# Patient Record
Sex: FEMALE | Race: WHITE | NOT HISPANIC OR LATINO | Employment: UNEMPLOYED | URBAN - METROPOLITAN AREA
[De-identification: names, ages, dates, MRNs, and addresses within clinical notes are randomized per-mention and may not be internally consistent; named-entity substitution may affect disease eponyms.]

---

## 2017-04-11 ENCOUNTER — ANESTHESIA EVENT (OUTPATIENT)
Dept: SURGERY | Facility: OTHER | Age: 52
End: 2017-04-11
Payer: COMMERCIAL

## 2017-04-11 ENCOUNTER — HOSPITAL ENCOUNTER (OUTPATIENT)
Dept: PREADMISSION TESTING | Facility: OTHER | Age: 52
Discharge: HOME | End: 2017-04-11
Attending: ORTHOPAEDIC SURGERY
Payer: COMMERCIAL

## 2017-04-11 VITALS
SYSTOLIC BLOOD PRESSURE: 119 MMHG | BODY MASS INDEX: 20.49 KG/M2 | TEMPERATURE: 98 F | DIASTOLIC BLOOD PRESSURE: 67 MMHG | HEART RATE: 82 BPM | HEIGHT: 64 IN | OXYGEN SATURATION: 99 % | WEIGHT: 120 LBS

## 2017-04-11 RX ORDER — ESTRADIOL AND NORETHINDRONE ACETATE .5; .1 MG/1; MG/1
1 TABLET ORAL DAILY
COMMUNITY
End: 2019-12-05

## 2017-04-11 RX ORDER — TOPIRAMATE 50 MG/1
75 TABLET, FILM COATED ORAL 2 TIMES DAILY
COMMUNITY

## 2017-04-11 RX ORDER — BUDESONIDE AND FORMOTEROL FUMARATE DIHYDRATE 80; 4.5 UG/1; UG/1
2 AEROSOL RESPIRATORY (INHALATION) 2 TIMES DAILY
COMMUNITY

## 2017-04-11 RX ORDER — ALBUTEROL SULFATE 0.83 MG/ML
2.5 SOLUTION RESPIRATORY (INHALATION)
Status: CANCELLED | OUTPATIENT
Start: 2017-04-11 | End: 2017-04-11

## 2017-04-11 RX ORDER — LEVOTHYROXINE SODIUM 88 UG/1
88 TABLET ORAL DAILY
COMMUNITY
End: 2018-08-07

## 2017-04-11 RX ORDER — HYDROCHLOROTHIAZIDE 12.5 MG/1
12.5 CAPSULE ORAL DAILY
COMMUNITY

## 2017-04-11 RX ORDER — CYANOCOBALAMIN (VITAMIN B-12) 1000 MCG
TABLET, EXTENDED RELEASE ORAL DAILY
COMMUNITY

## 2017-04-11 RX ORDER — MIDAZOLAM HYDROCHLORIDE 5 MG/ML
4 INJECTION INTRAMUSCULAR; INTRAVENOUS ONCE AS NEEDED
Status: CANCELLED | OUTPATIENT
Start: 2017-04-11 | End: 2017-04-11

## 2017-04-11 RX ORDER — SODIUM CHLORIDE, SODIUM LACTATE, POTASSIUM CHLORIDE, CALCIUM CHLORIDE 600; 310; 30; 20 MG/100ML; MG/100ML; MG/100ML; MG/100ML
INJECTION, SOLUTION INTRAVENOUS CONTINUOUS
Status: CANCELLED | OUTPATIENT
Start: 2017-04-11

## 2017-04-11 RX ORDER — MELOXICAM 15 MG/1
15 TABLET ORAL DAILY
COMMUNITY
End: 2019-12-05

## 2017-04-11 RX ORDER — MONTELUKAST SODIUM 10 MG/1
10 TABLET ORAL DAILY
COMMUNITY

## 2017-04-11 RX ORDER — FLUTICASONE PROPIONATE 50 MCG
1 SPRAY, SUSPENSION (ML) NASAL DAILY PRN
COMMUNITY
End: 2019-12-05

## 2017-04-11 RX ORDER — FAMOTIDINE 20 MG/1
20 TABLET, FILM COATED ORAL
Status: CANCELLED | OUTPATIENT
Start: 2017-04-11 | End: 2017-04-11

## 2017-04-11 NOTE — IP AVS SNAPSHOT
[facility] Location ([facility])  [address] [place], [place] [id number]  Phone: [phone]           Patient Discharge Instructions  Our goal is to set you up for success. This packet includes information on your condition, medications, and your home care. It will help you care for yourself to prevent having to return to the hospital.     Please ask your nurse if you have any questions.      There are many details to remember when preparing for your surgery. Here is what you will need to do, please ask your nurse if there are more specific instructions and if you have any questions:    1. Before procedure Do not smoke or drink alcoholic beverages 24 hours prior to your procedure. Do not eat or drink anything 8 hours before your procedure - this includes gum, mints, and candy.     2. Day of procedure Please remove all jewelry for the procedure. If you wear contact lenses, dentures, hearing aids or glasses, bring a container to put them in during your surgery and give to a family member.  If your doctor has scheduled you for an overnight stay, bring a small overnight bag with any personal items that you need.      3. After procedure  Make arrangements in advance for transportation home by a responsible adult. It is not safe to drive a vehicle during the 24 hours following surgery.     PLEASE NOTE: You may be contacted the day before your surgery to confirm your surgery date and arrival time. The Surgery schedule has many variables which may affect the time of your surgery case. Family members should be available if your surgery time changes.               ** Verify the list of medication(s) below is accurate and up to date. Carry this with you in case of emergency. If your medications have changed, please notify your healthcare provider.             Medication List      TAKE these medications        Additional Info                      ALLEGRA ORAL   Refills:  0    Instructions:  Take by mouth once daily.      Begin Date    AM    Noon    PM    Bedtime       COD LIVER OIL ORAL   Refills:  0    Instructions:  Take by mouth once daily.     Begin Date    AM    Noon    PM    Bedtime       FIBER ORAL   Refills:  0    Instructions:  Take by mouth once daily.     Begin Date    AM    Noon    PM    Bedtime       fluticasone 50 mcg/actuation nasal spray   Commonly known as:  FLONASE   Refills:  0   Dose:  1 spray    Instructions:  1 spray by Each Nare route daily as needed for Rhinitis.     Begin Date    AM    Noon    PM    Bedtime       hydrochlorothiazide 12.5 mg capsule   Commonly known as:  MICROZIDE   Refills:  0   Dose:  12.5 mg    Instructions:  Take 12.5 mg by mouth once daily.     Begin Date    AM    Noon    PM    Bedtime       levothyroxine 88 MCG tablet   Commonly known as:  SYNTHROID   Refills:  0   Dose:  88 mcg    Instructions:  Take 88 mcg by mouth once daily.     Begin Date    AM    Noon    PM    Bedtime       LOPREEZA 0.5-0.1 mg per tablet   Refills:  0   Dose:  1 tablet   Generic drug:  estradiol-norethindrone acet    Instructions:  Take 1 tablet by mouth once daily.     Begin Date    AM    Noon    PM    Bedtime       meloxicam 15 MG tablet   Commonly known as:  MOBIC   Refills:  0   Dose:  15 mg    Instructions:  Take 15 mg by mouth once daily.     Begin Date    AM    Noon    PM    Bedtime       montelukast 10 mg tablet   Commonly known as:  SINGULAIR   Refills:  0   Dose:  10 mg    Instructions:  Take 10 mg by mouth once daily.     Begin Date    AM    Noon    PM    Bedtime       PROAIR HFA INHL   Refills:  0    Instructions:  Inhale into the lungs as needed.     Begin Date    AM    Noon    PM    Bedtime       PROBIOTIC ORAL   Refills:  0    Instructions:  Take by mouth.     Begin Date    AM    Noon    PM    Bedtime       selenium 200 mcg Cap   Refills:  0    Instructions:  Take by mouth once daily.     Begin Date    AM    Noon    PM    Bedtime       SYMBICORT 80-4.5 mcg/actuation Hfaa   Refills:  0   Dose:  2  puff   Generic drug:  budesonide-formoterol 80-4.5 mcg    Instructions:  Inhale 2 puffs into the lungs 2 (two) times daily. Controller     Begin Date    AM    Noon    PM    Bedtime       TOPAMAX ORAL   Refills:  0   Dose:  75 mg    Instructions:  Take 75 mg by mouth 2 (two) times daily.     Begin Date    AM    Noon    PM    Bedtime       VITAMIN D-3 ORAL   Refills:  0    Instructions:  Take by mouth once daily.     Begin Date    AM    Noon    PM    Bedtime                  Please bring to all follow up appointments:    1. A copy of your discharge instructions.  2. All medicines you are currently taking in their original bottles.  3. Identification and insurance card.    Please arrive 15 minutes ahead of scheduled appointment time.    Please call 24 hours in advance if you must reschedule your appointment and/or time.        Your Future Surgeries/Procedures     Apr 18, 2017   Surgery with Eran Barnes MD   [facility] ([facility])    [address] [place] [id number]   [phone]                  Discharge Instructions       PRE-ADMIT TESTING -  [phone]    [address] [place] [address]        OUTPATIENT SURGERY UNIT - [phone]    Your surgery has been scheduled at [facility]. We are pleased to have the opportunity to serve you. For Further Information please call [phone].    On the day of surgery please report to the Information Desk on the 1st floor.    CONTACT YOUR PHYSICIAN'S OFFICE THE DAY PRIOR TO YOUR SURGERY TO OBTAIN YOUR ARRIVAL TIME.     The evening before surgery do not eat anything after 9 p.m. ( this includes hard candy, chewing gum and mints).  You may have GATORADE, POWERADE AND WATER FROM 9 p.m. until leaving home to come to the hospital.   DO NOT DRINK ANY LIQUIDS ON THE WAY TO THE HOSPITAL.     SPECIAL MEDICATION INSTRUCTIONS: TAKE medications checked off by the Anesthesiologist on your Medication  List.    Angiogram Patients: Take medications as instructed by your physician, including aspirin.     Surgery Patients:    If you take ASPIRIN - Your PHYSICIAN/SURGEON will need to inform you IF/OR when you need to stop taking aspirin prior to your surgery.     Do Not take any medications containing IBUPROFEN.  Do Not Wear any make-up or dark nail polish   (especially eye make-up) to surgery. If you come to surgery with makeup on you will be required to remove the makeup or nail polish.    Do not shave your surgical area at least 5 days prior to your surgery. The surgical prep will be performed at the hospital according to Infection Control regulations.    Leave all valuables at home.   Do Not wear any jewelry or watches, including any metal in body piercings.  Contact Lens must be removed before surgery. Either do not wear the contact lens or bring a case and solution for storage.  Please bring a container for eyeglasses or dentures as required.  Bring any paperwork your physician has provided, such as consent forms,  history and physicals, doctor's orders, etc.   Bring comfortable clothes that are loose fitting to wear upon discharge. Take into consideration the type of surgery being performed.  Maintain your diet as advised per your physician the day prior to surgery.      Adequate rest the night before surgery is advised.   Park in the Parking lot behind the hospital or in the [place] [facility] across the street from the parking lot. Parking is complimentary.  If you will be discharged the same day as your procedure, please arrange for a responsible adult to drive you home or to accompany you if traveling by taxi.   YOU WILL NOT BE PERMITTED TO DRIVE OR TO LEAVE THE HOSPITAL ALONE AFTER SURGERY.   It is strongly recommended that you arrange for someone to remain with you for the first 24 hrs following your surgery.       Thank you for your cooperation.  The Staff of [facility].        Bathing Instructions                                                                 Please shower the evening before and morning of your procedure with    ANTIBACTERIAL SOAP. ( DIAL, etc )  Concentrate on the surgical area   for at least 3 minutes and rinse completely. Dry off as usual.   Do not use any deodorant, powder, body lotions, perfume, after shave or    cologne.                                                Admission Information     Date & Time Provider Department CSN    4/11/2017  2:30 PM Eran Barnes MD [facility] [id number]      Care Providers     Provider Role Specialty Primary office phone    Eran Barnes MD Attending Provider Orthopedic Surgery [phone]      Your Vitals Were     BP Pulse Temp Height Weight Last Period    119/67 82 98.1 °F (36.7 °C) (Oral) 5' 4" (1.626 m) 54.4 kg (120 lb) 06/11/2015    SpO2 BMI             99% 20.6 kg/m2         Recent Lab Values     No lab values to display.      Allergies as of 4/11/2017     No Known Allergies      [facility]     [facility] Nurse Care Line - 24/7 Assistance  Unless otherwise directed by your provider, please contact [facility], our nurse care line that is available for 24/7 assistance.     Registered nurses in the [facility] provide clinical advisement, health education, appointment booking, and other advisory services.  Call for this free service at [phone].        Advance Directives     An advance directive is a document which, in the event you are no longer able to make decisions for yourself, tells your healthcare team what kind of treatment you do or do not want to receive, or who you would like to make those decisions for you.  If you do not currently have an advance directive, [facility] encourages you to create one.  For more information call:  [phone]), [phone] ([phone]),  or log on to [email or web address].        Language " Assistance Services     ATTENTION: Language assistance services are available, free of charge. Please call [phone].      ATENCIÓN: Si habla renato, tiene a valdivia disposición servicios gratuitos de asistencia lingüística. Llame al [phone].     CHÚ Ý: N?u b?n nói Ti?ng Vi?t, có các d?ch v? h? tr? ngôn ng? mi?n phí dành cho b?n. G?i s? [phone].         [facility] complies with applicable Federal civil rights laws and does not discriminate on the basis of race, color, national origin, age, disability, or sex.

## 2017-04-11 NOTE — ANESTHESIA PREPROCEDURE EVALUATION
04/11/2017  Amna Mcdonnell is a 51 y.o., female.    Anesthesia Evaluation    I have reviewed the Patient Summary Reports.    I have reviewed the Nursing Notes.   I have reviewed the Medications.     Review of Systems  Anesthesia Hx:  No problems with previous Anesthesia    Social:  Non-Smoker    Cardiovascular:   Exercise tolerance: good Hypertension, well controlled    Pulmonary:   Asthma mild Mycobacteerium avium complex (non-contageous TB)-in remission. Caused her mild asthma   Renal/:  Renal/ Normal     Hepatic/GI:  Hepatic/GI Normal    Neurological:  Neurology Normal    Endocrine:   Hypothyroidism        Physical Exam  General:  Well nourished    Airway/Jaw/Neck:  Mallampati: I     Dental:  Dental Findings: In tact             Anesthesia Plan  Type of Anesthesia, risks & benefits discussed:  Anesthesia Type:  general  Patient's Preference:   Intra-op Monitoring Plan: standard ASA monitors  Intra-op Monitoring Plan Comments:   Post Op Pain Control Plan:   Post Op Pain Control Plan Comments:   Induction:   IV  Beta Blocker:         Informed Consent: Patient understands risks and agrees with Anesthesia plan.  Questions answered.   ASA Score: 2     Day of Surgery Review of History & Physical:    H&P update referred to the surgeon.     Anesthesia Plan Notes: Surg on rt arm,IV ok in left arm acc to Dr Barnes even though manipulating left arm.No foot iv please        Ready For Surgery From Anesthesia Perspective.

## 2017-04-11 NOTE — DISCHARGE INSTRUCTIONS
PRE-ADMIT TESTING -  [phone]    [address]  [facility]        OUTPATIENT SURGERY UNIT - [phone]    Your surgery has been scheduled at [facility]. We are pleased to have the opportunity to serve you. For Further Information please call [phone].    On the day of surgery please report to the Information Desk on the 1st floor.    CONTACT YOUR PHYSICIAN'S OFFICE THE DAY PRIOR TO YOUR SURGERY TO OBTAIN YOUR ARRIVAL TIME.     The evening before surgery do not eat anything after 9 p.m. ( this includes hard candy, chewing gum and mints).  You may have GATORADE, POWERADE AND WATER FROM 9 p.m. until leaving home to come to the hospital.   DO NOT DRINK ANY LIQUIDS ON THE WAY TO THE HOSPITAL.     SPECIAL MEDICATION INSTRUCTIONS: TAKE medications checked off by the Anesthesiologist on your Medication List.    Angiogram Patients: Take medications as instructed by your physician, including aspirin.     Surgery Patients:    If you take ASPIRIN - Your PHYSICIAN/SURGEON will need to inform you IF/OR when you need to stop taking aspirin prior to your surgery.     Do Not take any medications containing IBUPROFEN.  Do Not Wear any make-up or dark nail polish   (especially eye make-up) to surgery. If you come to surgery with makeup on you will be required to remove the makeup or nail polish.    Do not shave your surgical area at least 5 days prior to your surgery. The surgical prep will be performed at the hospital according to Infection Control regulations.    Leave all valuables at home.   Do Not wear any jewelry or watches, including any metal in body piercings.  Contact Lens must be removed before surgery. Either do not wear the contact lens or bring a case and solution for storage.  Please bring a container for eyeglasses or dentures as required.  Bring any paperwork your physician has provided, such as consent forms,  history and physicals, doctor's orders, etc.   Bring comfortable  clothes that are loose fitting to wear upon discharge. Take into consideration the type of surgery being performed.  Maintain your diet as advised per your physician the day prior to surgery.      Adequate rest the night before surgery is advised.   Park in the Parking lot behind the hospital or in the [place] [facility] across the street from the parking lot. Parking is complimentary.  If you will be discharged the same day as your procedure, please arrange for a responsible adult to drive you home or to accompany you if traveling by taxi.   YOU WILL NOT BE PERMITTED TO DRIVE OR TO LEAVE THE HOSPITAL ALONE AFTER SURGERY.   It is strongly recommended that you arrange for someone to remain with you for the first 24 hrs following your surgery.       Thank you for your cooperation.  The Staff of [facility].        Bathing Instructions                                                                 Please shower the evening before and morning of your procedure with    ANTIBACTERIAL SOAP. ( DIAL, etc )  Concentrate on the surgical area   for at least 3 minutes and rinse completely. Dry off as usual.   Do not use any deodorant, powder, body lotions, perfume, after shave or    cologne.

## 2017-04-18 ENCOUNTER — HOSPITAL ENCOUNTER (OUTPATIENT)
Facility: OTHER | Age: 52
Discharge: HOME | End: 2017-04-18
Attending: ORTHOPAEDIC SURGERY | Admitting: ORTHOPAEDIC SURGERY
Payer: COMMERCIAL

## 2017-04-18 ENCOUNTER — ANESTHESIA (OUTPATIENT)
Dept: SURGERY | Facility: OTHER | Age: 52
End: 2017-04-18
Payer: COMMERCIAL

## 2017-04-18 VITALS
DIASTOLIC BLOOD PRESSURE: 68 MMHG | SYSTOLIC BLOOD PRESSURE: 110 MMHG | RESPIRATION RATE: 16 BRPM | TEMPERATURE: 98 F | HEART RATE: 65 BPM | HEIGHT: 64 IN | WEIGHT: 120 LBS | OXYGEN SATURATION: 100 % | BODY MASS INDEX: 20.49 KG/M2

## 2017-04-18 DIAGNOSIS — M75.01 ADHESIVE BURSITIS OF RIGHT SHOULDER: Primary | ICD-10-CM

## 2017-04-18 PROCEDURE — 25000242 PHARM REV CODE 250 ALT 637 W/ HCPCS: Performed by: ANESTHESIOLOGY

## 2017-04-18 PROCEDURE — 63600175 PHARM REV CODE 636 W HCPCS: Performed by: NURSE ANESTHETIST, CERTIFIED REGISTERED

## 2017-04-18 PROCEDURE — 37000008 HC ANESTHESIA 1ST 15 MINUTES: Performed by: ORTHOPAEDIC SURGERY

## 2017-04-18 PROCEDURE — 63600175 PHARM REV CODE 636 W HCPCS: Performed by: ORTHOPAEDIC SURGERY

## 2017-04-18 PROCEDURE — 63600175 PHARM REV CODE 636 W HCPCS: Performed by: ANESTHESIOLOGY

## 2017-04-18 PROCEDURE — 25000003 PHARM REV CODE 250: Performed by: NURSE ANESTHETIST, CERTIFIED REGISTERED

## 2017-04-18 PROCEDURE — 71000033 HC RECOVERY, INTIAL HOUR: Performed by: ORTHOPAEDIC SURGERY

## 2017-04-18 PROCEDURE — 94640 AIRWAY INHALATION TREATMENT: CPT

## 2017-04-18 PROCEDURE — 36000710: Performed by: ORTHOPAEDIC SURGERY

## 2017-04-18 PROCEDURE — 71000015 HC POSTOP RECOV 1ST HR: Performed by: ORTHOPAEDIC SURGERY

## 2017-04-18 PROCEDURE — 36000711: Performed by: ORTHOPAEDIC SURGERY

## 2017-04-18 PROCEDURE — 25000003 PHARM REV CODE 250: Performed by: ANESTHESIOLOGY

## 2017-04-18 PROCEDURE — 27201423 OPTIME MED/SURG SUP & DEVICES STERILE SUPPLY: Performed by: ORTHOPAEDIC SURGERY

## 2017-04-18 PROCEDURE — 37000009 HC ANESTHESIA EA ADD 15 MINS: Performed by: ORTHOPAEDIC SURGERY

## 2017-04-18 PROCEDURE — 71000039 HC RECOVERY, EACH ADD'L HOUR: Performed by: ORTHOPAEDIC SURGERY

## 2017-04-18 PROCEDURE — 71000016 HC POSTOP RECOV ADDL HR: Performed by: ORTHOPAEDIC SURGERY

## 2017-04-18 RX ORDER — DEXAMETHASONE SODIUM PHOSPHATE 4 MG/ML
INJECTION, SOLUTION INTRA-ARTICULAR; INTRALESIONAL; INTRAMUSCULAR; INTRAVENOUS; SOFT TISSUE
Status: DISCONTINUED | OUTPATIENT
Start: 2017-04-18 | End: 2017-04-18

## 2017-04-18 RX ORDER — MEPERIDINE HYDROCHLORIDE 50 MG/ML
12.5 INJECTION INTRAMUSCULAR; INTRAVENOUS; SUBCUTANEOUS ONCE AS NEEDED
Status: COMPLETED | OUTPATIENT
Start: 2017-04-18 | End: 2017-04-18

## 2017-04-18 RX ORDER — EPINEPHRINE 1 MG/ML
INJECTION, SOLUTION INTRACARDIAC; INTRAMUSCULAR; INTRAVENOUS; SUBCUTANEOUS
Status: DISCONTINUED | OUTPATIENT
Start: 2017-04-18 | End: 2017-04-18 | Stop reason: HOSPADM

## 2017-04-18 RX ORDER — HYDROCODONE BITARTRATE AND ACETAMINOPHEN 5; 325 MG/1; MG/1
1 TABLET ORAL EVERY 4 HOURS PRN
Status: DISCONTINUED | OUTPATIENT
Start: 2017-04-18 | End: 2017-04-18 | Stop reason: HOSPADM

## 2017-04-18 RX ORDER — HYDROMORPHONE HYDROCHLORIDE 2 MG/ML
0.4 INJECTION, SOLUTION INTRAMUSCULAR; INTRAVENOUS; SUBCUTANEOUS EVERY 5 MIN PRN
Status: DISCONTINUED | OUTPATIENT
Start: 2017-04-18 | End: 2017-04-18 | Stop reason: HOSPADM

## 2017-04-18 RX ORDER — MIDAZOLAM HYDROCHLORIDE 5 MG/ML
4 INJECTION INTRAMUSCULAR; INTRAVENOUS ONCE AS NEEDED
Status: COMPLETED | OUTPATIENT
Start: 2017-04-18 | End: 2017-04-18

## 2017-04-18 RX ORDER — CEFAZOLIN SODIUM 1 G/50ML
1 SOLUTION INTRAVENOUS
Status: COMPLETED | OUTPATIENT
Start: 2017-04-18 | End: 2017-04-18

## 2017-04-18 RX ORDER — ONDANSETRON 2 MG/ML
4 INJECTION INTRAMUSCULAR; INTRAVENOUS ONCE AS NEEDED
Status: DISCONTINUED | OUTPATIENT
Start: 2017-04-18 | End: 2017-04-18 | Stop reason: HOSPADM

## 2017-04-18 RX ORDER — ALBUTEROL SULFATE 0.83 MG/ML
2.5 SOLUTION RESPIRATORY (INHALATION)
Status: COMPLETED | OUTPATIENT
Start: 2017-04-18 | End: 2017-04-18

## 2017-04-18 RX ORDER — PROPOFOL 10 MG/ML
VIAL (ML) INTRAVENOUS
Status: DISCONTINUED | OUTPATIENT
Start: 2017-04-18 | End: 2017-04-18

## 2017-04-18 RX ORDER — FENTANYL CITRATE 50 UG/ML
25 INJECTION, SOLUTION INTRAMUSCULAR; INTRAVENOUS EVERY 5 MIN PRN
Status: DISCONTINUED | OUTPATIENT
Start: 2017-04-18 | End: 2017-04-18 | Stop reason: HOSPADM

## 2017-04-18 RX ORDER — FAMOTIDINE 20 MG/1
20 TABLET, FILM COATED ORAL
Status: COMPLETED | OUTPATIENT
Start: 2017-04-18 | End: 2017-04-18

## 2017-04-18 RX ORDER — GLYCOPYRROLATE 0.2 MG/ML
INJECTION INTRAMUSCULAR; INTRAVENOUS
Status: DISCONTINUED | OUTPATIENT
Start: 2017-04-18 | End: 2017-04-18

## 2017-04-18 RX ORDER — SODIUM CHLORIDE 0.9 % (FLUSH) 0.9 %
3 SYRINGE (ML) INJECTION
Status: DISCONTINUED | OUTPATIENT
Start: 2017-04-18 | End: 2017-04-18 | Stop reason: HOSPADM

## 2017-04-18 RX ORDER — SODIUM CHLORIDE, SODIUM LACTATE, POTASSIUM CHLORIDE, CALCIUM CHLORIDE 600; 310; 30; 20 MG/100ML; MG/100ML; MG/100ML; MG/100ML
INJECTION, SOLUTION INTRAVENOUS CONTINUOUS
Status: DISCONTINUED | OUTPATIENT
Start: 2017-04-18 | End: 2017-04-18 | Stop reason: HOSPADM

## 2017-04-18 RX ORDER — FENTANYL CITRATE 50 UG/ML
INJECTION, SOLUTION INTRAMUSCULAR; INTRAVENOUS
Status: DISCONTINUED | OUTPATIENT
Start: 2017-04-18 | End: 2017-04-18

## 2017-04-18 RX ORDER — MIDAZOLAM HYDROCHLORIDE 1 MG/ML
INJECTION INTRAMUSCULAR; INTRAVENOUS
Status: DISCONTINUED | OUTPATIENT
Start: 2017-04-18 | End: 2017-04-18

## 2017-04-18 RX ORDER — NEOSTIGMINE METHYLSULFATE 1 MG/ML
INJECTION, SOLUTION INTRAVENOUS
Status: DISCONTINUED | OUTPATIENT
Start: 2017-04-18 | End: 2017-04-18

## 2017-04-18 RX ORDER — PHENYLEPHRINE HYDROCHLORIDE 10 MG/ML
INJECTION INTRAVENOUS
Status: DISCONTINUED | OUTPATIENT
Start: 2017-04-18 | End: 2017-04-18

## 2017-04-18 RX ORDER — SODIUM CHLORIDE 0.9 % (FLUSH) 0.9 %
3 SYRINGE (ML) INJECTION EVERY 8 HOURS
Status: DISCONTINUED | OUTPATIENT
Start: 2017-04-18 | End: 2017-04-18 | Stop reason: HOSPADM

## 2017-04-18 RX ORDER — LIDOCAINE HCL/PF 100 MG/5ML
SYRINGE (ML) INTRAVENOUS
Status: DISCONTINUED | OUTPATIENT
Start: 2017-04-18 | End: 2017-04-18

## 2017-04-18 RX ORDER — HYDROCODONE BITARTRATE AND ACETAMINOPHEN 10; 325 MG/1; MG/1
1 TABLET ORAL EVERY 4 HOURS PRN
Qty: 40 TABLET | Refills: 0 | Status: SHIPPED | OUTPATIENT
Start: 2017-04-18 | End: 2019-12-05

## 2017-04-18 RX ORDER — OXYCODONE HYDROCHLORIDE 5 MG/1
5 TABLET ORAL
Status: DISCONTINUED | OUTPATIENT
Start: 2017-04-18 | End: 2017-04-18 | Stop reason: HOSPADM

## 2017-04-18 RX ORDER — HYDROCODONE BITARTRATE AND ACETAMINOPHEN 10; 325 MG/1; MG/1
1 TABLET ORAL EVERY 4 HOURS PRN
Status: DISCONTINUED | OUTPATIENT
Start: 2017-04-18 | End: 2017-04-18 | Stop reason: HOSPADM

## 2017-04-18 RX ORDER — ROCURONIUM BROMIDE 10 MG/ML
INJECTION, SOLUTION INTRAVENOUS
Status: DISCONTINUED | OUTPATIENT
Start: 2017-04-18 | End: 2017-04-18

## 2017-04-18 RX ORDER — ROPIVACAINE HYDROCHLORIDE 5 MG/ML
INJECTION, SOLUTION EPIDURAL; INFILTRATION; PERINEURAL
Status: DISCONTINUED | OUTPATIENT
Start: 2017-04-18 | End: 2017-04-18 | Stop reason: HOSPADM

## 2017-04-18 RX ORDER — ACETAMINOPHEN 10 MG/ML
INJECTION, SOLUTION INTRAVENOUS
Status: DISCONTINUED | OUTPATIENT
Start: 2017-04-18 | End: 2017-04-18

## 2017-04-18 RX ORDER — ONDANSETRON 2 MG/ML
INJECTION INTRAMUSCULAR; INTRAVENOUS
Status: DISCONTINUED | OUTPATIENT
Start: 2017-04-18 | End: 2017-04-18

## 2017-04-18 RX ADMIN — ACETAMINOPHEN 1000 MG: 10 INJECTION, SOLUTION INTRAVENOUS at 07:04

## 2017-04-18 RX ADMIN — HYDROMORPHONE HYDROCHLORIDE 0.4 MG: 2 INJECTION INTRAMUSCULAR; INTRAVENOUS; SUBCUTANEOUS at 08:04

## 2017-04-18 RX ADMIN — CEFAZOLIN SODIUM 1 G: 1 SOLUTION INTRAVENOUS at 07:04

## 2017-04-18 RX ADMIN — GLYCOPYRROLATE 0.6 MG: 0.2 INJECTION, SOLUTION INTRAMUSCULAR; INTRAVENOUS at 07:04

## 2017-04-18 RX ADMIN — PROMETHAZINE HYDROCHLORIDE 6.25 MG: 25 INJECTION INTRAMUSCULAR; INTRAVENOUS at 08:04

## 2017-04-18 RX ADMIN — HYDROMORPHONE HYDROCHLORIDE 0.4 MG: 2 INJECTION INTRAMUSCULAR; INTRAVENOUS; SUBCUTANEOUS at 09:04

## 2017-04-18 RX ADMIN — OXYCODONE HYDROCHLORIDE 5 MG: 5 TABLET ORAL at 10:04

## 2017-04-18 RX ADMIN — FAMOTIDINE 20 MG: 20 TABLET, FILM COATED ORAL at 06:04

## 2017-04-18 RX ADMIN — ONDANSETRON 4 MG: 2 INJECTION INTRAMUSCULAR; INTRAVENOUS at 07:04

## 2017-04-18 RX ADMIN — FENTANYL CITRATE 100 MCG: 50 INJECTION, SOLUTION INTRAMUSCULAR; INTRAVENOUS at 06:04

## 2017-04-18 RX ADMIN — LIDOCAINE HYDROCHLORIDE 75 MG: 20 INJECTION, SOLUTION INTRAVENOUS at 06:04

## 2017-04-18 RX ADMIN — MEPERIDINE HYDROCHLORIDE 12.5 MG: 50 INJECTION INTRAMUSCULAR; INTRAVENOUS; SUBCUTANEOUS at 08:04

## 2017-04-18 RX ADMIN — SODIUM CHLORIDE, SODIUM LACTATE, POTASSIUM CHLORIDE, AND CALCIUM CHLORIDE: 600; 310; 30; 20 INJECTION, SOLUTION INTRAVENOUS at 06:04

## 2017-04-18 RX ADMIN — PROPOFOL 150 MG: 10 INJECTION, EMULSION INTRAVENOUS at 06:04

## 2017-04-18 RX ADMIN — ALBUTEROL SULFATE 2.5 MG: 2.5 SOLUTION RESPIRATORY (INHALATION) at 06:04

## 2017-04-18 RX ADMIN — ROCURONIUM BROMIDE 35 MG: 10 INJECTION, SOLUTION INTRAVENOUS at 06:04

## 2017-04-18 RX ADMIN — PHENYLEPHRINE HYDROCHLORIDE 100 MCG: 10 INJECTION INTRAVENOUS at 07:04

## 2017-04-18 RX ADMIN — CARBOXYMETHYLCELLULOSE SODIUM 2 DROP: 2.5 SOLUTION/ DROPS OPHTHALMIC at 06:04

## 2017-04-18 RX ADMIN — MIDAZOLAM HYDROCHLORIDE 2 MG: 1 INJECTION, SOLUTION INTRAMUSCULAR; INTRAVENOUS at 06:04

## 2017-04-18 RX ADMIN — MIDAZOLAM HYDROCHLORIDE 4 MG: 5 INJECTION, SOLUTION INTRAMUSCULAR; INTRAVENOUS at 06:04

## 2017-04-18 RX ADMIN — DEXAMETHASONE SODIUM PHOSPHATE 4 MG: 4 INJECTION, SOLUTION INTRAMUSCULAR; INTRAVENOUS at 07:04

## 2017-04-18 RX ADMIN — NEOSTIGMINE METHYLSULFATE 4 MG: 1 INJECTION INTRAVENOUS at 07:04

## 2017-04-18 NOTE — PLAN OF CARE
Problem: Patient Care Overview  Goal: Plan of Care Review  Outcome: Ongoing (interventions implemented as appropriate)  Patient on room air with adequate saturation in no distress; one time aerosol treatment given tolerated well.

## 2017-04-18 NOTE — PLAN OF CARE
Amna Mcdonnell has met all discharge criteria from Phase II. Vital Signs are stable, ambulating  without difficulty.Pain is now under control and tolerable for the pt. Pain score 5 at this time.  Discharge instructions given, patient verbalized understanding. Discharged from facility via wheelchair in stable condition.

## 2017-04-18 NOTE — OP NOTE
DATE OF PROCEDURE:  04/18/2017    CHIEF COMPLAINT AND PRESENT ILLNESS:  The patient is a 51-year-old with   stiffness, pain, both shoulders, right worse than left.  She has had injections,   therapy for many months, no improvement.  Diagnosis, adhesive capsulitis, both   sides, right worse than left.  Because of long duration, no improvement, the   patient elected for left shoulder manipulation, right shoulder manipulation and   lysis of adhesions arthroscopy and right side is worse.  Significant risks   discussed.    PREOPERATIVE DIAGNOSIS:  Bilateral adhesive capsulitis of the shoulder.    POSTOPERATIVE DIAGNOSIS:  Bilateral adhesive capsulitis of the shoulder.    PROCEDURES:  1.  Right shoulder manipulation under anesthesia, arthroscopic lysis of   adhesions with subacromial decompression.  2.  Left shoulder manipulation under anesthesia.    SURGEON:  Eran Barnes M.D.    ASSISTANT:  Urmila Shaw CST.    COMPLICATIONS:  None.    ANESTHESIA:  General anesthesia.    BLOOD LOSS:  None.    IMPLANTS:  None.    PROCEDURE IN DETAIL:  The patient was brought to the Operating Room and   underwent general intubation without difficulty.  The right shoulder was   manipulated under anesthesia.  She got to about 110 degrees forward elevation   and then just stopped.  So, just gentle pressure and the usual crepitance lysis   occurred and then checked with the rotation and she had full range of motion   after that, no instability.  She was placed carefully in beach chair position   and the right shoulder was prepped and draped in the usual sterile fashion.    Using posterior viewing portal, anterior working portal, examination as follows:    The glenohumeral joint actually looked pretty good.  No chondromalacia.    Labrum looked good and it showed some synovitis which is typical, which was   cleaned up with a shaver and the electrothermal device.  The biceps was in good   shape.  The undersurface of the rotator cuff  looked good.  Attention then turned   to subacromial space with posterior viewing portal, lateral working portal.    She did have a fair amount of bursitis.  She did have downsloping of the   acromion, which was removed with a bur shaver and the electrothermal device.    The bursal surface of the rotator cuff showed the irritation, but no tearing or   anything.  The shoulder was brought through a range of motion.  Again made sure   that I had good clear space.  Instruments then removed.  A 3-0 Monocryl was used   subcuticularly, Steri-Strips on the skin.  Then, 0.5% ropivacaine was instilled   in the soft tissues and a sterile bandage was applied.  Then, I went over to   the left side and did a manipulation.  The left side was nearest tight.  She got   about 140 or so forward elevation before I met resistance and then did the   gentle pressure and got the lysis of adhesions and crepitance.  I got full range   of motion out of it and then, no instability afterwards and again, full motion.    I put about 10 mL of ropivacaine on the left side.  She was brought under   clean conditions.  The patient was brought to the Recovery Room in stable   fashion.      NAVJOT  dd: 04/18/2017 07:49:22 (CDT)  td: 04/18/2017 10:05:12 (CDT)  Doc ID   #[id number]  Job ID #[id number]    CC:

## 2017-04-18 NOTE — TRANSFER OF CARE
"Anesthesia Transfer of Care Note    Patient: Amna Mcdonnell    Procedure(s) Performed: Procedure(s) (LRB):  MANIPULATION-SHOULDER (Bilateral)  LYSIS-ADHESION (Right)  ARTHROSCOPY-SHOULDER (Right)  DECOMPRESSION-SHOULDER-ARTHROSCOPIC (Right)    Patient location: PACU    Anesthesia Type: general    Transport from OR: Transported from OR on 2-3 L/min O2 by NC with adequate spontaneous ventilation    Post pain: adequate analgesia    Post assessment: no apparent anesthetic complications    Post vital signs: stable    Level of consciousness: awake, alert and oriented    Nausea/Vomiting: no nausea/vomiting    Complications: none          Last vitals:   Visit Vitals    /81 (BP Location: Right arm, Patient Position: Lying)    Pulse 76    Temp 36.8 °C (98.2 °F) (Oral)    Resp 16    Ht 5' 4" (1.626 m)    Wt 54.4 kg (120 lb)    LMP 06/11/2015    SpO2 98%    Breastfeeding No    BMI 20.6 kg/m2     "

## 2017-04-18 NOTE — INTERVAL H&P NOTE
The patient has been examined and the H&P has been reviewed:    I concur with the findings and no changes have occurred since H&P was written.    Anesthesia/Surgery risks, benefits and alternative options discussed and understood by patient/family.          Active Hospital Problems    Diagnosis  POA    *Adhesive bursitis of right shoulder [M75.01]  Yes      Resolved Hospital Problems    Diagnosis Date Resolved POA   No resolved problems to display.

## 2017-04-18 NOTE — OR NURSING
Pt resting with eyes closed and VSS, when woken for pain assessment, states Right shoulder pain is 8/10 and immediately falls asleep snoring.

## 2017-04-18 NOTE — BRIEF OP NOTE
[facility]  Brief Operative Note     SUMMARY     Surgery Date: 4/18/2017     Surgeon(s) and Role:     * Eran Barnes MD - Primary    Assisting Surgeon: None    Pre-op Diagnosis:  Adhesive bursitis of right shoulder [M75.01]    Post-op Diagnosis:  Post-Op Diagnosis Codes:     * Adhesive bursitis of right shoulder [M75.01]    Procedure(s) (LRB):  MANIPULATION-SHOULDER (Bilateral)  LYSIS-ADHESION (Right)  ARTHROSCOPY-SHOULDER (Right)  DECOMPRESSION-SHOULDER-ARTHROSCOPIC (Right)    Anesthesia: General    Description of the findings of the procedure: manip left  Scope right    Findings/Key Components: adhesive cap    Estimated Blood Loss: * No values recorded between 4/18/2017  7:15 AM and 4/18/2017  7:45 AM *0         Specimens:   Specimen     None          Discharge Note    SUMMARY     Admit Date: 4/18/2017    Discharge Date and Time:  04/18/2017 7:45 AM    Hospital Course (synopsis of major diagnoses, care, treatment, and services provided during the course of the hospital stay): The above patient underwent the above outpatient procedure. The patient tolerated procedure well and will be discharged today.--see orders.       Final Diagnosis: Post-Op Diagnosis Codes:     * Adhesive bursitis of right shoulder [M75.01]    Disposition: Home or Self Care    Follow Up/Patient Instructions:     Medications:  Reconciled Home Medications:   Current Discharge Medication List      START taking these medications    Details   hydrocodone-acetaminophen 10-325mg (NORCO)  mg Tab Take 1 tablet by mouth every 4 (four) hours as needed for Pain.  Qty: 40 tablet, Refills: 0         CONTINUE these medications which have NOT CHANGED    Details   ALBUTEROL SULFATE (PROAIR HFA INHL) Inhale into the lungs as needed.      budesonide-formoterol 80-4.5 mcg (SYMBICORT) 80-4.5 mcg/actuation HFAA Inhale 2 puffs into the lungs 2 (two) times daily. Controller      CALCIUM CARBONATE/VITAMIN D3 (VITAMIN D-3 ORAL) Take by  mouth once daily.      COD LIVER OIL ORAL Take by mouth once daily.      estradiol-norethindrone acet (LOPREEZA) 0.5-0.1 mg per tablet Take 1 tablet by mouth once daily.      FEXOFENADINE HCL (ALLEGRA ORAL) Take by mouth once daily.      fluticasone (FLONASE) 50 mcg/actuation nasal spray 1 spray by Each Nare route daily as needed for Rhinitis.      hydrochlorothiazide (MICROZIDE) 12.5 mg capsule Take 12.5 mg by mouth once daily.      LACTOBACILLUS ACIDOPHILUS (PROBIOTIC ORAL) Take by mouth.      levothyroxine (SYNTHROID) 88 MCG tablet Take 88 mcg by mouth once daily.      meloxicam (MOBIC) 15 MG tablet Take 15 mg by mouth once daily.      montelukast (SINGULAIR) 10 mg tablet Take 10 mg by mouth once daily.      PSYLLIUM SEED, WITH DEXTROSE, (FIBER ORAL) Take by mouth once daily.      selenium 200 mcg Cap Take by mouth once daily.      TOPIRAMATE (TOPAMAX ORAL) Take 75 mg by mouth 2 (two) times daily.             Discharge Procedure Orders  Diet general     Call MD for:  temperature >100.4     Call MD for:  persistent nausea and vomiting     Call MD for:  severe uncontrolled pain     Call MD for:  difficulty breathing, headache or visual disturbances     Call MD for:  redness, tenderness, or signs of infection (pain, swelling, redness, odor or green/yellow discharge around incision site)     Call MD for:  hives     Call MD for:  persistent dizziness or light-headedness     Call MD for:  extreme fatigue     Leave dressing on - Keep it clean, dry, and intact until clinic visit       Follow-up Information     Please follow up.    Why:  AS SCHEDULED

## 2017-04-18 NOTE — ANESTHESIA POSTPROCEDURE EVALUATION
"Anesthesia Post Evaluation    Patient: Amna Mcdonnell    Procedure(s) Performed: Procedure(s) (LRB):  MANIPULATION-SHOULDER (Bilateral)  LYSIS-ADHESION (Right)  ARTHROSCOPY-SHOULDER (Right)  DECOMPRESSION-SHOULDER-ARTHROSCOPIC (Right)    Final Anesthesia Type: general  Patient location during evaluation: PACU  Patient participation: Yes- Able to Participate  Level of consciousness: awake and alert and oriented  Post-procedure vital signs: reviewed and stable  Pain management: adequate  Airway patency: patent  PONV status at discharge: No PONV  Anesthetic complications: no      Cardiovascular status: blood pressure returned to baseline and hemodynamically stable  Respiratory status: unassisted, spontaneous ventilation and room air  Hydration status: euvolemic  Follow-up not needed.        Visit Vitals    /78 (BP Location: Left arm, Patient Position: Lying, BP Method: Automatic)    Pulse 70    Temp 36.7 °C (98 °F) (Oral)    Resp 15    Ht 5' 4" (1.626 m)    Wt 54.4 kg (120 lb)    LMP 06/11/2015    SpO2 100%    Breastfeeding No    BMI 20.6 kg/m2       Pain/Flash Score: Pain Assessment Performed: Yes (4/18/2017  8:56 AM)  Presence of Pain: complains of pain/discomfort (4/18/2017  9:55 AM)  Pain Rating Prior to Med Admin: 5 (4/18/2017 10:19 AM)  Pain Rating Post Med Admin: 8 (4/18/2017  9:07 AM)  Flash Score: 9 (4/18/2017  9:55 AM)  Modified Flash Score: 17 (4/18/2017  8:29 AM)      "

## 2017-04-18 NOTE — IP AVS SNAPSHOT
[facility] ([facility])  [address] [place], [place] [address]  Phone: [phone]           Patient Discharge Instructions   Our goal is to set you up for success. This packet includes information on your condition, medications, and your home care.  It will help you care for yourself to prevent having to return to the hospital.     Please ask your nurse if you have any questions.      There are many details to remember when preparing to leave the hospital. Here is what you will need to do:    1. Take your medicine. If you are prescribed medications, review your Medication List on the following pages. You may have new medications to  at the pharmacy and others that you'll need to stop taking. Review the instructions for how and when to take your medications. Talk with your doctor or nurses if you are unsure of what to do.     2. Go to your follow-up appointments. Specific follow-up information is listed in the following pages. Your may be contacted by a nurse or clinical provider about future appointments. Be sure we have all of the phone numbers to reach you. Please contact your provider's office if you are unable to make an appointment.     3. Watch for warning signs. Your doctor or nurse will give you detailed warning signs to watch for and when to call for assistance. These instructions may also include educational information about your condition. If you experience any of warning signs to your health, call your doctor.               ** Verify the list of medication(s) below is accurate and up to date. Carry this with you in case of emergency. If your medications have changed, please notify your healthcare provider.             Medication List      START taking these medications        Additional Info                      hydrocodone-acetaminophen 10-325mg  mg Tab   Commonly known as:  NORCO   Quantity:  40 tablet   Refills:  0   Dose:  1 tablet    Instructions:  Take 1 tablet by  mouth every 4 (four) hours as needed for Pain.     Begin Date    AM    Noon    PM    Bedtime         CONTINUE taking these medications        Additional Info                      ALLEGRA ORAL   Refills:  0    Instructions:  Take by mouth once daily.     Begin Date    AM    Noon    PM    Bedtime       COD LIVER OIL ORAL   Refills:  0    Instructions:  Take by mouth once daily.     Begin Date    AM    Noon    PM    Bedtime       FIBER ORAL   Refills:  0    Instructions:  Take by mouth once daily.     Begin Date    AM    Noon    PM    Bedtime       fluticasone 50 mcg/actuation nasal spray   Commonly known as:  FLONASE   Refills:  0   Dose:  1 spray    Instructions:  1 spray by Each Nare route daily as needed for Rhinitis.     Begin Date    AM    Noon    PM    Bedtime       hydrochlorothiazide 12.5 mg capsule   Commonly known as:  MICROZIDE   Refills:  0   Dose:  12.5 mg    Instructions:  Take 12.5 mg by mouth once daily.     Begin Date    AM    Noon    PM    Bedtime       levothyroxine 88 MCG tablet   Commonly known as:  SYNTHROID   Refills:  0   Dose:  88 mcg    Instructions:  Take 88 mcg by mouth once daily.     Begin Date    AM    Noon    PM    Bedtime       LOPREEZA 0.5-0.1 mg per tablet   Refills:  0   Dose:  1 tablet   Generic drug:  estradiol-norethindrone acet    Instructions:  Take 1 tablet by mouth once daily.     Begin Date    AM    Noon    PM    Bedtime       meloxicam 15 MG tablet   Commonly known as:  MOBIC   Refills:  0   Dose:  15 mg    Instructions:  Take 15 mg by mouth once daily.     Begin Date    AM    Noon    PM    Bedtime       montelukast 10 mg tablet   Commonly known as:  SINGULAIR   Refills:  0   Dose:  10 mg    Instructions:  Take 10 mg by mouth once daily.     Begin Date    AM    Noon    PM    Bedtime       PROAIR HFA INHL   Refills:  0    Instructions:  Inhale into the lungs as needed.     Begin Date    AM    Noon    PM    Bedtime       PROBIOTIC ORAL   Refills:  0    Instructions:  Take by  mouth.     Begin Date    AM    Noon    PM    Bedtime       selenium 200 mcg Cap   Refills:  0    Instructions:  Take by mouth once daily.     Begin Date    AM    Noon    PM    Bedtime       SYMBICORT 80-4.5 mcg/actuation Hfaa   Refills:  0   Dose:  2 puff   Generic drug:  budesonide-formoterol 80-4.5 mcg    Instructions:  Inhale 2 puffs into the lungs 2 (two) times daily. Controller     Begin Date    AM    Noon    PM    Bedtime       TOPAMAX ORAL   Refills:  0   Dose:  75 mg    Instructions:  Take 75 mg by mouth 2 (two) times daily.     Begin Date    AM    Noon    PM    Bedtime       VITAMIN D-3 ORAL   Refills:  0    Instructions:  Take by mouth once daily.     Begin Date    AM    Noon    PM    Bedtime            Where to Get Your Medications      You can get these medications from any pharmacy     Bring a paper prescription for each of these medications     hydrocodone-acetaminophen 10-325mg  mg Tab                  Please bring to all follow up appointments:    1. A copy of your discharge instructions.  2. All medicines you are currently taking in their original bottles.  3. Identification and insurance card.    Please arrive 15 minutes ahead of scheduled appointment time.    Please call 24 hours in advance if you must reschedule your appointment and/or time.        Follow-up Information     Please follow up.    Why:  AS SCHEDULED         Discharge Instructions     Future Orders    Call MD for:  difficulty breathing, headache or visual disturbances     Call MD for:  extreme fatigue     Call MD for:  hives     Call MD for:  persistent dizziness or light-headedness     Call MD for:  persistent nausea and vomiting     Call MD for:  redness, tenderness, or signs of infection (pain, swelling, redness, odor or green/yellow discharge around incision site)     Call MD for:  severe uncontrolled pain     Call MD for:  temperature >100.4     Diet general     Questions:    Total calories:      Fat restriction, if any:       Protein restriction, if any:      Na restriction, if any:      Fluid restriction:      Additional restrictions:      Leave dressing on - Keep it clean, dry, and intact until clinic visit         Discharge Instructions         FOLLOW ALL INSTRUCTIONS GIVEN TO YOU BY DR. ANGEL    Anesthesia: After Your Surgery  Youve just had surgery. During surgery, you received medication called anesthesia to keep you comfortable and pain-free. After surgery, you may experience some pain or nausea. This is common. Here are some tips for feeling better and recovering after surgery.    Going home  Your doctor or nurse will show you how to take care of yourself when you go home. He or she will also answer your questions. Have an adult family member or friend drive you home. For the first 24 hours after your surgery:  · Do not drive or use heavy equipment.  · Do not make important decisions or sign legal documents.  · Avoid alcohol.  · Have someone stay with you, if needed. He or she can watch for problems and help keep you safe.  Be sure to keep all follow-up appointments with your doctor. And rest after your procedure for as long as your doctor tells you to.    Coping with pain  If you have pain after surgery, pain medication will help you feel better. Take it as directed, before pain becomes severe. Also, ask your doctor or pharmacist about other ways to control pain, such as with heat, ice, and relaxation. And follow any other instructions your surgeon or nurse gives you.    Tips for taking pain medication  To get the best relief possible, remember these points:  · Pain medications can upset your stomach. Taking them with a little food may help.  · Most pain relievers taken by mouth need at least 20 to 30 minutes to take effect.  · Taking medication on a schedule can help you remember to take it. Try to time your medication so that you can take it before beginning an activity, such as dressing, walking, or sitting down for  dinner.  · Constipation is a common side effect of pain medications. Contact your doctor before taking any medications like laxatives or stool softeners to help relieve constipation. Also ask about any dietary restrictions, because drinking lots of fluids and eating foods like fruits and vegetables that are high in fiber can also help. Remember, dont take laxatives unless your surgeon has prescribed them.  · Mixing alcohol and pain medication can cause dizziness and slow your breathing. It can even be fatal. Dont drink alcohol while taking pain medication.  · Pain medication can slow your reflexes. Dont drive or operate machinery while taking pain medication.  If your health care provider tells you to take acetaminophen to help relieve your pain, ask him or her how much you are supposed to take each day. (Acetaminophen is the generic name for Tylenol and other brand-name pain relievers.) Acetaminophen or other pain relievers may interact with your prescription medicines or other over-the-counter (OTC) drugs. Some prescription medications contain acetaminophen along with other active ingredients. Using both prescription and OTC acetaminophen for pain can cause you to overdose. The FDA recommends that you read the labels on your OTC medications carefully. This will help you to clearly understand the list of active ingredients, dosing instructions, and any warnings. It may also help you avoid taking too much acetaminophen. If you have questions or don't understand the information, ask your pharmacist or health care provider to explain it to you before you take the OTC medication.    Managing nausea  Some people have an upset stomach after surgery. This is often due to anesthesia, pain, pain medications, or the stress of surgery. The following tips will help you manage nausea and get good nutrition as you recover. If you were on a special diet before surgery, ask your doctor if you should follow it during recovery.  "These tips may help:  · Dont push yourself to eat. Your body will tell you when to eat and how much.  · Start off with clear liquids and soup. They are easier to digest.  · Progress to semi-solid foods (mashed potatoes, applesauce, and gelatin) as you feel ready.  · Slowly move to solid foods. Dont eat fatty, rich, or spicy foods at first.  · Dont force yourself to have three large meals a day. Instead, eat smaller amounts more often.  · Take pain medications with a small amount of solid food, such as crackers or toast to avoid nausea.      Call your surgeon if  · You still have pain an hour after taking medication (it may not be strong enough).  · You feel too sleepy, dizzy, or groggy (medication may be too strong).  · You have side effects like nausea, vomiting, or skin changes (rash, itching, or hives).   © 2574-0949 [employer]. [address] [place] [address]. All rights reserved. This information is not intended as a substitute for professional medical care. Always follow your healthcare professional's instructions.                      Primary Diagnosis     Your primary diagnosis was:  Frozen Shoulder      Admission Information     Date & Time Provider Department CSN    4/18/2017  5:46 AM Eran Barnes MD [facility] [id number]      Care Providers     Provider Role Specialty Primary office phone    Eran Barnes MD Attending Provider Orthopedic Surgery [phone]    Eran Barnes MD Surgeon  Orthopedic Surgery [phone]      Your Vitals Were     BP Pulse Temp Resp Height Weight    144/77 70 97.7 °F (36.5 °C) 16 5' 4" (1.626 m) 54.4 kg (120 lb)    Last Period SpO2 BMI          06/11/2015 100% 20.6 kg/m2        Recent Lab Values     No lab values to display.      Allergies as of 4/18/2017     No Known Allergies      [facility]     [facility] Nurse Care Line - 24/7 Assistance  Unless otherwise directed by your provider, please contact " [facility], our nurse care line that is available for 24/7 assistance.     Registered nurses in the [facility] provide clinical advisement, health education, appointment booking, and other advisory services.  Call for this free service at [phone].        Advance Directives     An advance directive is a document which, in the event you are no longer able to make decisions for yourself, tells your healthcare team what kind of treatment you do or do not want to receive, or who you would like to make those decisions for you.  If you do not currently have an advance directive, [facility] encourages you to create one.  For more information call:  [phone]), [phone] ([phone]),  or log on to [email or web address].        Language Assistance Services     ATTENTION: Language assistance services are available, free of charge. Please call [phone].      ATENCIÓN: Si habla español, tiene a valdivia disposición servicios gratuitos de asistencia lingüística. Llame al [phone].     CHÚ Ý: N?u b?n nói Ti?ng Vi?t, có các d?ch v? h? tr? ngôn ng? mi?n phí dành cho b?n. G?i s? [phone].         [facility] complies with applicable Federal civil rights laws and does not discriminate on the basis of race, color, national origin, age, disability, or sex.

## 2017-04-18 NOTE — DISCHARGE INSTRUCTIONS
FOLLOW ALL INSTRUCTIONS GIVEN TO YOU BY DR. ANGEL    Anesthesia: After Your Surgery  Youve just had surgery. During surgery, you received medication called anesthesia to keep you comfortable and pain-free. After surgery, you may experience some pain or nausea. This is common. Here are some tips for feeling better and recovering after surgery.    Going home  Your doctor or nurse will show you how to take care of yourself when you go home. He or she will also answer your questions. Have an adult family member or friend drive you home. For the first 24 hours after your surgery:  · Do not drive or use heavy equipment.  · Do not make important decisions or sign legal documents.  · Avoid alcohol.  · Have someone stay with you, if needed. He or she can watch for problems and help keep you safe.  Be sure to keep all follow-up appointments with your doctor. And rest after your procedure for as long as your doctor tells you to.    Coping with pain  If you have pain after surgery, pain medication will help you feel better. Take it as directed, before pain becomes severe. Also, ask your doctor or pharmacist about other ways to control pain, such as with heat, ice, and relaxation. And follow any other instructions your surgeon or nurse gives you.    Tips for taking pain medication  To get the best relief possible, remember these points:  · Pain medications can upset your stomach. Taking them with a little food may help.  · Most pain relievers taken by mouth need at least 20 to 30 minutes to take effect.  · Taking medication on a schedule can help you remember to take it. Try to time your medication so that you can take it before beginning an activity, such as dressing, walking, or sitting down for dinner.  · Constipation is a common side effect of pain medications. Contact your doctor before taking any medications like laxatives or stool softeners to help relieve constipation. Also ask about any dietary restrictions,  because drinking lots of fluids and eating foods like fruits and vegetables that are high in fiber can also help. Remember, dont take laxatives unless your surgeon has prescribed them.  · Mixing alcohol and pain medication can cause dizziness and slow your breathing. It can even be fatal. Dont drink alcohol while taking pain medication.  · Pain medication can slow your reflexes. Dont drive or operate machinery while taking pain medication.  If your health care provider tells you to take acetaminophen to help relieve your pain, ask him or her how much you are supposed to take each day. (Acetaminophen is the generic name for Tylenol and other brand-name pain relievers.) Acetaminophen or other pain relievers may interact with your prescription medicines or other over-the-counter (OTC) drugs. Some prescription medications contain acetaminophen along with other active ingredients. Using both prescription and OTC acetaminophen for pain can cause you to overdose. The FDA recommends that you read the labels on your OTC medications carefully. This will help you to clearly understand the list of active ingredients, dosing instructions, and any warnings. It may also help you avoid taking too much acetaminophen. If you have questions or don't understand the information, ask your pharmacist or health care provider to explain it to you before you take the OTC medication.    Managing nausea  Some people have an upset stomach after surgery. This is often due to anesthesia, pain, pain medications, or the stress of surgery. The following tips will help you manage nausea and get good nutrition as you recover. If you were on a special diet before surgery, ask your doctor if you should follow it during recovery. These tips may help:  · Dont push yourself to eat. Your body will tell you when to eat and how much.  · Start off with clear liquids and soup. They are easier to digest.  · Progress to semi-solid foods (mashed potatoes,  applesauce, and gelatin) as you feel ready.  · Slowly move to solid foods. Dont eat fatty, rich, or spicy foods at first.  · Dont force yourself to have three large meals a day. Instead, eat smaller amounts more often.  · Take pain medications with a small amount of solid food, such as crackers or toast to avoid nausea.      Call your surgeon if  · You still have pain an hour after taking medication (it may not be strong enough).  · You feel too sleepy, dizzy, or groggy (medication may be too strong).  · You have side effects like nausea, vomiting, or skin changes (rash, itching, or hives).   © 0486-8287 [employer]. [address] [place] [address], [place] [address]. All rights reserved. This information is not intended as a substitute for professional medical care. Always follow your healthcare professional's instructions.

## 2018-06-05 ENCOUNTER — TELEPHONE (OUTPATIENT)
Dept: ENDOCRINOLOGY | Facility: CLINIC | Age: 53
End: 2018-06-05

## 2018-06-05 NOTE — TELEPHONE ENCOUNTER
Spoke with the pt and explain her that we cant sent by [facility]. Pt stated that she will be ok to mail her out. Mail out to pt.

## 2018-06-05 NOTE — TELEPHONE ENCOUNTER
----- Message from Aleisha Kaufman sent at 6/5/2018  2:49 PM CDT -----  Contact: Self [phone]  New patient     pls send a medical release form via [facility].

## 2018-06-27 NOTE — PROGRESS NOTES
Subjective:      Patient ID: Amna Mcdonnell is a 53 y.o. female.    Chief Complaint:  Hashimoto's Thyroiditis (New patient )    History of Present Illness  Amna Mcdonnell presents today for Evaluation & management of hashimoto's & hypothyroidism.     Diagnosed ~ 2 years ago.     She is taking estrogen & progesterone managed by her OBGYN.     With regards to her hypothyroidism:    Current medication:  Synthroid 88 mcg daily (has been on this dose for ~ 1 month)    -- Dr. Duarte increased her dose from 75 mcg to 88 mcg daily ~ 2 weeks ago.     Takes thyroid medication properly without food first thing in the morning     current symptoms:   Denies weight gain  Denies Fatigue   Denies Constipation   Denies Hair loss  Denies Brittle nails  No cp, palpations or sob  Denies heat/cold intolerance    BMD done by OBGYN every 2 years and per patient she was normal.     Is tolerating selenium 200 mcg daily    Taking biotin daily.    Review of Systems   Constitutional: Negative for fatigue and unexpected weight change.   Eyes: Negative for visual disturbance.   Respiratory: Negative for cough and shortness of breath.    Cardiovascular: Negative for chest pain.   Gastrointestinal: Negative for abdominal pain.   Endocrine: Negative for cold intolerance, heat intolerance, polydipsia, polyphagia and polyuria.   Musculoskeletal: Negative for arthralgias.   Skin: Negative for wound.   Neurological: Negative for headaches.   Hematological: Does not bruise/bleed easily.   Psychiatric/Behavioral: Negative for sleep disturbance.     Objective:   Physical Exam   Constitutional: She appears well-developed.   HENT:   Right Ear: External ear normal.   Left Ear: External ear normal.   Nose: Nose normal.   Hearing Normal     Neck: No tracheal deviation present. No thyromegaly present.   Cardiovascular: Normal rate.    No murmur heard.  No edema present   Pulmonary/Chest: Effort normal and breath sounds normal.   Abdominal: Soft.  She exhibits no mass. No hernia.   Neurological: She is alert. No cranial nerve deficit or sensory deficit.   Skin: No rash noted.   No nodules.   Psychiatric: She has a normal mood and affect. Judgment normal.   Vitals reviewed.    Body mass index is 22.97 kg/m².    Lab Review:             Lab Results   Component Value Date     07/10/2009    K 3.3 (L) 07/10/2009    CL 99 07/10/2009    CO2 22 (L) 07/10/2009    GLU 91 07/10/2009    BUN 10 07/10/2009    CREATININE 0.9 07/10/2009    CALCIUM 9.2 07/10/2009       Assessment and Plan     1. Hypothyroidism due to Hashimoto's thyroiditis  Comprehensive metabolic panel    Vitamin D    TSH    Thyrotropin receptor antibody    Thyroid peroxidase antibody     Hypothyroidism  -- Long discussion about Hashimoto's and TSH.     -- Clinically and biochemically euthyroid  -- Goal is a normal TSH  -- Check TFTs with antibodies today and adjust dosage accordingly   -- Avoid exogenous hyperthyroidism as this can accelerate bone loss and increase risk of CV complications.  -- Advised to take LT4 on an empty stomach with water and to wait 30-45 minutes before eating or taking other medications   -- Reviewed usual  times of thyroid hormone  changes- call if start/ stop ocps, weight changes, attempting conception and during pregnancy   -- Reviewed that symptoms of hypothyroidism may not correlate with tsh, and a normal TSH is the goal of therapy.....  symptoms are not a justification for over treatment     Ok to take selenium 100 mcg bid to try and decrease thyroid antibodies         Follow-up in about 1 year (around 6/29/2019).

## 2018-06-29 ENCOUNTER — OFFICE VISIT (OUTPATIENT)
Dept: ENDOCRINOLOGY | Facility: CLINIC | Age: 53
End: 2018-06-29
Payer: COMMERCIAL

## 2018-06-29 VITALS
HEART RATE: 68 BPM | SYSTOLIC BLOOD PRESSURE: 102 MMHG | DIASTOLIC BLOOD PRESSURE: 60 MMHG | BODY MASS INDEX: 22.85 KG/M2 | WEIGHT: 133.81 LBS | HEIGHT: 64 IN

## 2018-06-29 DIAGNOSIS — E06.3 HYPOTHYROIDISM DUE TO HASHIMOTO'S THYROIDITIS: Primary | ICD-10-CM

## 2018-06-29 DIAGNOSIS — E03.8 HYPOTHYROIDISM DUE TO HASHIMOTO'S THYROIDITIS: Primary | ICD-10-CM

## 2018-06-29 PROBLEM — E03.9 HYPOTHYROIDISM: Status: ACTIVE | Noted: 2018-06-29

## 2018-06-29 PROCEDURE — 3008F BODY MASS INDEX DOCD: CPT | Mod: CPTII,S$GLB,, | Performed by: NURSE PRACTITIONER

## 2018-06-29 PROCEDURE — 99203 OFFICE O/P NEW LOW 30 MIN: CPT | Mod: S$GLB,,, | Performed by: NURSE PRACTITIONER

## 2018-06-29 PROCEDURE — 99999 PR PBB SHADOW E&M-EST. PATIENT-LVL III: CPT | Mod: PBBFAC,,, | Performed by: NURSE PRACTITIONER

## 2018-06-29 NOTE — ASSESSMENT & PLAN NOTE
-- Long discussion about Hashimoto's and TSH.     -- Clinically and biochemically euthyroid  -- Goal is a normal TSH  -- Check TFTs with antibodies today and adjust dosage accordingly   -- Avoid exogenous hyperthyroidism as this can accelerate bone loss and increase risk of CV complications.  -- Advised to take LT4 on an empty stomach with water and to wait 30-45 minutes before eating or taking other medications   -- Reviewed usual  times of thyroid hormone  changes- call if start/ stop ocps, weight changes, attempting conception and during pregnancy   -- Reviewed that symptoms of hypothyroidism may not correlate with tsh, and a normal TSH is the goal of therapy.....  symptoms are not a justification for over treatment     Ok to take selenium 100 mcg bid to try and decrease thyroid antibodies

## 2018-06-29 NOTE — LETTER
June 29, 2018      Casi Crouch MD  [address] [facility] [address]           [facility] - Endocrinology  [address]  [facility] [address]  Phone: [phone]          Patient: Amna Mcdonnell   MR Number: [id number]   YOB: 1965   Date of Visit: 6/29/2018       Dear Dr. Casi Crouch:    Thank you for referring Amna Mcdonnell to me for evaluation. Attached you will find relevant portions of my assessment and plan of care.    If you have questions, please do not hesitate to call me. I look forward to following Amna Mcdonnell along with you.    Sincerely,    Nedra Foster, NP    Enclosure  CC:  No Recipients    If you would like to receive this communication electronically, please contact [email or web address] or [phone] to request more information on [employer] Link access.    For providers and/or their staff who would like to refer a patient to [facility], please contact us through our one-stop-shop provider referral line, [facility] , at [phone].    If you feel you have received this communication in error or would no longer like to receive these types of communications, please e-mail [email or web address]

## 2018-07-03 ENCOUNTER — LAB VISIT (OUTPATIENT)
Dept: LAB | Facility: HOSPITAL | Age: 53
End: 2018-07-03
Attending: NURSE PRACTITIONER
Payer: COMMERCIAL

## 2018-07-03 DIAGNOSIS — E06.3 HYPOTHYROIDISM DUE TO HASHIMOTO'S THYROIDITIS: ICD-10-CM

## 2018-07-03 DIAGNOSIS — E03.8 HYPOTHYROIDISM DUE TO HASHIMOTO'S THYROIDITIS: ICD-10-CM

## 2018-07-03 LAB
25(OH)D3+25(OH)D2 SERPL-MCNC: 48 NG/ML
ALBUMIN SERPL BCP-MCNC: 3.8 G/DL
ALP SERPL-CCNC: 51 U/L
ALT SERPL W/O P-5'-P-CCNC: 16 U/L
ANION GAP SERPL CALC-SCNC: 8 MMOL/L
AST SERPL-CCNC: 16 U/L
BILIRUB SERPL-MCNC: 0.7 MG/DL
BUN SERPL-MCNC: 16 MG/DL
CALCIUM SERPL-MCNC: 9.1 MG/DL
CHLORIDE SERPL-SCNC: 105 MMOL/L
CO2 SERPL-SCNC: 25 MMOL/L
CREAT SERPL-MCNC: 0.8 MG/DL
EST. GFR  (AFRICAN AMERICAN): >60 ML/MIN/1.73 M^2
EST. GFR  (NON AFRICAN AMERICAN): >60 ML/MIN/1.73 M^2
GLUCOSE SERPL-MCNC: 90 MG/DL
POTASSIUM SERPL-SCNC: 3.4 MMOL/L
PROT SERPL-MCNC: 7 G/DL
SODIUM SERPL-SCNC: 138 MMOL/L
T4 FREE SERPL-MCNC: 1.26 NG/DL
THYROPEROXIDASE IGG SERPL-ACNC: 594.2 IU/ML
TSH SERPL DL<=0.005 MIU/L-ACNC: 0.36 UIU/ML

## 2018-07-03 PROCEDURE — 84443 ASSAY THYROID STIM HORMONE: CPT

## 2018-07-03 PROCEDURE — 36415 COLL VENOUS BLD VENIPUNCTURE: CPT | Mod: PO

## 2018-07-03 PROCEDURE — 82306 VITAMIN D 25 HYDROXY: CPT

## 2018-07-03 PROCEDURE — 83520 IMMUNOASSAY QUANT NOS NONAB: CPT

## 2018-07-03 PROCEDURE — 86376 MICROSOMAL ANTIBODY EACH: CPT

## 2018-07-03 PROCEDURE — 84439 ASSAY OF FREE THYROXINE: CPT

## 2018-07-03 PROCEDURE — 80053 COMPREHEN METABOLIC PANEL: CPT

## 2018-07-05 ENCOUNTER — PATIENT MESSAGE (OUTPATIENT)
Dept: ENDOCRINOLOGY | Facility: CLINIC | Age: 53
End: 2018-07-05

## 2018-07-05 DIAGNOSIS — E03.8 HYPOTHYROIDISM DUE TO HASHIMOTO'S THYROIDITIS: Primary | ICD-10-CM

## 2018-07-05 DIAGNOSIS — E06.3 HYPOTHYROIDISM DUE TO HASHIMOTO'S THYROIDITIS: Primary | ICD-10-CM

## 2018-07-06 LAB — TSH RECEP AB SER-ACNC: <1 IU/L

## 2018-08-06 ENCOUNTER — LAB VISIT (OUTPATIENT)
Dept: LAB | Facility: HOSPITAL | Age: 53
End: 2018-08-06
Attending: INTERNAL MEDICINE
Payer: COMMERCIAL

## 2018-08-06 ENCOUNTER — PATIENT MESSAGE (OUTPATIENT)
Dept: ENDOCRINOLOGY | Facility: CLINIC | Age: 53
End: 2018-08-06

## 2018-08-06 DIAGNOSIS — E03.8 HYPOTHYROIDISM DUE TO HASHIMOTO'S THYROIDITIS: ICD-10-CM

## 2018-08-06 DIAGNOSIS — E06.3 HYPOTHYROIDISM DUE TO HASHIMOTO'S THYROIDITIS: ICD-10-CM

## 2018-08-06 LAB
T4 FREE SERPL-MCNC: 1.24 NG/DL
TSH SERPL DL<=0.005 MIU/L-ACNC: 0.19 UIU/ML

## 2018-08-06 PROCEDURE — 84443 ASSAY THYROID STIM HORMONE: CPT

## 2018-08-06 PROCEDURE — 36415 COLL VENOUS BLD VENIPUNCTURE: CPT | Mod: PO

## 2018-08-06 PROCEDURE — 84439 ASSAY OF FREE THYROXINE: CPT

## 2018-08-07 ENCOUNTER — PATIENT MESSAGE (OUTPATIENT)
Dept: ENDOCRINOLOGY | Facility: CLINIC | Age: 53
End: 2018-08-07

## 2018-08-07 ENCOUNTER — TELEPHONE (OUTPATIENT)
Dept: ENDOCRINOLOGY | Facility: CLINIC | Age: 53
End: 2018-08-07

## 2018-08-07 DIAGNOSIS — E03.8 HYPOTHYROIDISM DUE TO HASHIMOTO'S THYROIDITIS: Primary | ICD-10-CM

## 2018-08-07 DIAGNOSIS — E06.3 HYPOTHYROIDISM DUE TO HASHIMOTO'S THYROIDITIS: Primary | ICD-10-CM

## 2018-08-07 RX ORDER — LEVOTHYROXINE SODIUM 75 UG/1
75 TABLET ORAL DAILY
Qty: 30 TABLET | Refills: 11 | Status: SHIPPED | OUTPATIENT
Start: 2018-08-07 | End: 2019-10-10 | Stop reason: SDUPTHER

## 2018-08-07 NOTE — TELEPHONE ENCOUNTER
----- Message from Nedra Foster NP sent at 8/7/2018  8:40 AM CDT -----  Please schedule labs in 4 weeks and alert patient.    Thank you,   Nedra

## 2018-09-11 ENCOUNTER — LAB VISIT (OUTPATIENT)
Dept: LAB | Facility: HOSPITAL | Age: 53
End: 2018-09-11
Attending: NURSE PRACTITIONER
Payer: COMMERCIAL

## 2018-09-11 DIAGNOSIS — E06.3 HYPOTHYROIDISM DUE TO HASHIMOTO'S THYROIDITIS: ICD-10-CM

## 2018-09-11 DIAGNOSIS — E03.8 HYPOTHYROIDISM DUE TO HASHIMOTO'S THYROIDITIS: ICD-10-CM

## 2018-09-11 LAB — TSH SERPL DL<=0.005 MIU/L-ACNC: 1.77 UIU/ML

## 2018-09-11 PROCEDURE — 36415 COLL VENOUS BLD VENIPUNCTURE: CPT | Mod: PO

## 2018-09-11 PROCEDURE — 84443 ASSAY THYROID STIM HORMONE: CPT

## 2019-10-10 DIAGNOSIS — E06.3 HYPOTHYROIDISM DUE TO HASHIMOTO'S THYROIDITIS: ICD-10-CM

## 2019-10-10 DIAGNOSIS — E03.8 HYPOTHYROIDISM DUE TO HASHIMOTO'S THYROIDITIS: ICD-10-CM

## 2019-10-10 RX ORDER — LEVOTHYROXINE SODIUM 75 UG/1
TABLET ORAL
Qty: 30 TABLET | Refills: 0 | Status: SHIPPED | OUTPATIENT
Start: 2019-10-10 | End: 2019-12-05

## 2019-11-04 ENCOUNTER — TELEPHONE (OUTPATIENT)
Dept: PULMONOLOGY | Facility: CLINIC | Age: 54
End: 2019-11-04

## 2019-11-04 NOTE — TELEPHONE ENCOUNTER
----- Message from Moon Toledo sent at 11/4/2019 10:05 AM CST -----  Contact: self / [phone]  Will be a new patient and needs an appointment. Please advise

## 2019-11-04 NOTE — TELEPHONE ENCOUNTER
Spoke with patient, informed her that I have received her message. Patient states that she needs a appointment with Dr Henderson. I advised patient that Dr Henderson does not have any available appointments at this time, however I can schedule her with another physician. Patient states that she prefer to see Dr Henderson. I advised patient that I will add her to Dr Henderson waiting list and if she starts to feel worse to contact her Primary Care Physician or she can go to the nearest Emergency Room. Patient verbalizes that she understand.

## 2019-11-26 ENCOUNTER — TELEPHONE (OUTPATIENT)
Dept: PULMONOLOGY | Facility: CLINIC | Age: 54
End: 2019-11-26

## 2019-11-26 DIAGNOSIS — J45.909 ASTHMA, UNSPECIFIED ASTHMA SEVERITY, UNSPECIFIED WHETHER COMPLICATED, UNSPECIFIED WHETHER PERSISTENT: Primary | ICD-10-CM

## 2019-12-05 ENCOUNTER — OFFICE VISIT (OUTPATIENT)
Dept: PULMONOLOGY | Facility: CLINIC | Age: 54
End: 2019-12-05
Payer: COMMERCIAL

## 2019-12-05 ENCOUNTER — HOSPITAL ENCOUNTER (OUTPATIENT)
Dept: RADIOLOGY | Facility: HOSPITAL | Age: 54
Discharge: HOME | End: 2019-12-05
Attending: INTERNAL MEDICINE
Payer: COMMERCIAL

## 2019-12-05 ENCOUNTER — HOSPITAL ENCOUNTER (OUTPATIENT)
Dept: PULMONOLOGY | Facility: CLINIC | Age: 54
Discharge: HOME | End: 2019-12-05
Payer: COMMERCIAL

## 2019-12-05 VITALS
SYSTOLIC BLOOD PRESSURE: 130 MMHG | HEIGHT: 64 IN | DIASTOLIC BLOOD PRESSURE: 80 MMHG | HEART RATE: 91 BPM | BODY MASS INDEX: 22.53 KG/M2 | OXYGEN SATURATION: 97 % | WEIGHT: 132 LBS

## 2019-12-05 DIAGNOSIS — J45.909 ASTHMA, UNSPECIFIED ASTHMA SEVERITY, UNSPECIFIED WHETHER COMPLICATED, UNSPECIFIED WHETHER PERSISTENT: ICD-10-CM

## 2019-12-05 DIAGNOSIS — J45.40 MODERATE PERSISTENT ASTHMA WITHOUT COMPLICATION: Primary | ICD-10-CM

## 2019-12-05 DIAGNOSIS — A31.9 NONTUBERCULOUS ATYPICAL MYCOBACTERIAL DISEASE: ICD-10-CM

## 2019-12-05 DIAGNOSIS — J30.9 ALLERGIC RHINITIS, UNSPECIFIED SEASONALITY, UNSPECIFIED TRIGGER: ICD-10-CM

## 2019-12-05 PROCEDURE — 94060 PR EVAL OF BRONCHOSPASM: ICD-10-PCS | Mod: S$GLB,,, | Performed by: INTERNAL MEDICINE

## 2019-12-05 PROCEDURE — 94729 DIFFUSING CAPACITY: CPT | Mod: S$GLB,,, | Performed by: INTERNAL MEDICINE

## 2019-12-05 PROCEDURE — 3008F BODY MASS INDEX DOCD: CPT | Mod: CPTII,S$GLB,, | Performed by: INTERNAL MEDICINE

## 2019-12-05 PROCEDURE — 94727 PR PULM FUNCTION TEST BY GAS: ICD-10-PCS | Mod: S$GLB,,, | Performed by: INTERNAL MEDICINE

## 2019-12-05 PROCEDURE — 99204 OFFICE O/P NEW MOD 45 MIN: CPT | Mod: 25,S$GLB,, | Performed by: INTERNAL MEDICINE

## 2019-12-05 PROCEDURE — 99999 PR PBB SHADOW E&M-EST. PATIENT-LVL III: ICD-10-PCS | Mod: PBBFAC,,, | Performed by: INTERNAL MEDICINE

## 2019-12-05 PROCEDURE — 3008F PR BODY MASS INDEX (BMI) DOCUMENTED: ICD-10-PCS | Mod: CPTII,S$GLB,, | Performed by: INTERNAL MEDICINE

## 2019-12-05 PROCEDURE — 94729 PR C02/MEMBANE DIFFUSE CAPACITY: ICD-10-PCS | Mod: S$GLB,,, | Performed by: INTERNAL MEDICINE

## 2019-12-05 PROCEDURE — 99204 PR OFFICE/OUTPT VISIT, NEW, LEVL IV, 45-59 MIN: ICD-10-PCS | Mod: 25,S$GLB,, | Performed by: INTERNAL MEDICINE

## 2019-12-05 PROCEDURE — 99999 PR PBB SHADOW E&M-EST. PATIENT-LVL III: CPT | Mod: PBBFAC,,, | Performed by: INTERNAL MEDICINE

## 2019-12-05 PROCEDURE — 71046 X-RAY EXAM CHEST 2 VIEWS: CPT | Mod: 26,,, | Performed by: RADIOLOGY

## 2019-12-05 PROCEDURE — 71046 X-RAY EXAM CHEST 2 VIEWS: CPT | Mod: TC

## 2019-12-05 PROCEDURE — 94727 GAS DIL/WSHOT DETER LNG VOL: CPT | Mod: S$GLB,,, | Performed by: INTERNAL MEDICINE

## 2019-12-05 PROCEDURE — 71046 XR CHEST PA AND LATERAL: ICD-10-PCS | Mod: 26,,, | Performed by: RADIOLOGY

## 2019-12-05 PROCEDURE — 94060 EVALUATION OF WHEEZING: CPT | Mod: S$GLB,,, | Performed by: INTERNAL MEDICINE

## 2019-12-05 RX ORDER — IPRATROPIUM BROMIDE 42 UG/1
SPRAY, METERED NASAL
Refills: 5 | COMMUNITY
Start: 2019-10-23

## 2019-12-05 RX ORDER — LEVOFLOXACIN 500 MG/1
TABLET, FILM COATED ORAL
Refills: 0 | COMMUNITY
Start: 2019-11-27 | End: 2023-07-12

## 2019-12-05 RX ORDER — ALBUTEROL SULFATE 0.83 MG/ML
SOLUTION RESPIRATORY (INHALATION)
Refills: 5 | COMMUNITY
Start: 2019-10-23

## 2019-12-05 RX ORDER — CYANOCOBALAMIN (VITAMIN B-12) 250 MCG
250 TABLET ORAL DAILY
COMMUNITY

## 2019-12-05 RX ORDER — ESTRADIOL 1 MG/G
GEL TOPICAL
Refills: 11 | COMMUNITY
Start: 2019-11-13

## 2019-12-05 RX ORDER — ALBUTEROL SULFATE 90 UG/1
1 AEROSOL, METERED RESPIRATORY (INHALATION)
COMMUNITY
Start: 2013-11-26 | End: 2023-07-12

## 2019-12-05 RX ORDER — PROGESTERONE 100 MG/1
CAPSULE ORAL
Refills: 0 | COMMUNITY
Start: 2019-11-12

## 2019-12-05 RX ORDER — THYROID, PORCINE 90 MG/1
TABLET ORAL
Refills: 0 | COMMUNITY
Start: 2019-11-13

## 2019-12-05 RX ORDER — LANOLIN ALCOHOL/MO/W.PET/CERES
200 CREAM (GRAM) TOPICAL DAILY
COMMUNITY

## 2019-12-05 NOTE — PROGRESS NOTES
"Subjective:       Patient ID: Amna Mcdonnell is a 54 y.o. female.    Chief Complaint: Asthma    HPI:   Amna Mcdonnell is a 54 y.o. female new to me who presents as a second opinion for moderate persistent asthma.    Ms Mcdonnell has a h/o pulmonary MAC that was dx in 2009 via bronchoscopy. She completed 3 years of abx before the infection was "cleared." She states she was dx with asthma at that time and started on a ICS/LABA. She reports her sx were fairly well controlled until this year when she was dx with PNA followed by "whooping cough". She has undergone multiple rounds of abx including amoxicillin and is currently on levofloxacin.     She reports a 6-7lb weight loss in the last several months as well as a cough that is present throughout the day and night. Denies significant mucous production. She reports increased night sweats as well as fatigue/malaise. She notes she is requiring more sleep to feel refreshed. She reports these symptoms are similar to her presentation in 2009. She is using an ICS/LABA daily as well as PRN RAYSHAWN with some benefit.    She denies having a CT done of her chest at any point, but is scheduled to have a CT next week at .    She had a CXR in June that was read as lingular bronchiectasis with e/o mucoid impaction.    She denies rashes, joint sx, hair/nail changes, dry eyes/mouth, snoring. She does report sinus pressure, ear pressure and a post nasal gtt with occ voice changes. Noting more GERD sx since starting abx.     Review of Systems   Constitutional: Positive for weight loss, activity change, fatigue and night sweats. Negative for fever and chills.   HENT: Positive for postnasal drip, sinus pressure, voice change, congestion, ear pain and hearing loss. Negative for nosebleeds, sore throat and trouble swallowing.    Eyes: Negative for redness.   Respiratory: Positive for cough, asthma nighttime symptoms, dyspnea on extertion and use of rescue inhaler. Negative for " snoring, sputum production and wheezing.    Cardiovascular: Negative for chest pain, palpitations and leg swelling.   Musculoskeletal: Negative for joint swelling and myalgias.   Skin: Negative for rash.   Gastrointestinal: Positive for acid reflux.   Neurological: Negative for weakness.   Psychiatric/Behavioral: Negative for sleep disturbance.         Social History     Tobacco Use    Smoking status: Never Smoker    Smokeless tobacco: Never Used   Substance Use Topics    Alcohol use: No       Review of patient's allergies indicates:  No Known Allergies  Past Medical History:   Diagnosis Date    Asthma     Hashimoto's disease     Hypertension     Mycobacterium avium complex 2013    patient reports on antibiotics x 3 years; in remission    Thyroid disease     Hashimoto's     Past Surgical History:   Procedure Laterality Date    BREAST SURGERY      reduction    KNEE ARTHROSCOPY Left     SHOULDER SURGERY  2017    both      Current Outpatient Medications on File Prior to Visit   Medication Sig    AFLURIA QD 2019-20,3YR UP,,PF, 60 mcg (15 mcg x 4)/0.5 mL Syrg ADM 0.5ML IM UTD    albuterol (PROAIR HFA) 90 mcg/actuation inhaler 1 puff.    albuterol (PROVENTIL) 2.5 mg /3 mL (0.083 %) nebulizer solution INHALE THE CONTENTS OF 1 VIAL VIA NEBULIZER THREE TIMES DAILY    ALBUTEROL SULFATE (PROAIR HFA INHL) Inhale into the lungs as needed.    ARMOUR THYROID 90 mg Tab TK 1 T PO QD OR 1/2 T PO BID    budesonide-formoterol 80-4.5 mcg (SYMBICORT) 80-4.5 mcg/actuation HFAA Inhale 2 puffs into the lungs 2 (two) times daily. Controller    CALCIUM CARBONATE/VITAMIN D3 (VITAMIN D-3 ORAL) Take by mouth once daily.    cyanocobalamin (VITAMIN B-12) 250 MCG tablet Take 250 mcg by mouth once daily.    DIVIGEL 1 mg/gram (0.1 %) topical gel APPLY 1 PACKET DAILY TO ALTERNATING LEGS UTD    hydrochlorothiazide (MICROZIDE) 12.5 mg capsule Take 12.5 mg by mouth once daily.    ipratropium (ATROVENT) 42 mcg (0.06 %) nasal spray U  2 SPRAYS IEN BID    LACTOBACILLUS ACIDOPHILUS (PROBIOTIC ORAL) Take by mouth.    levoFLOXacin (LEVAQUIN) 500 MG tablet TK 1 T PO Q 24 HOURS FOR 5 DAYS    magnesium oxide (MAG-OX) 400 mg (241.3 mg magnesium) tablet Take 200 mg by mouth once daily.    montelukast (SINGULAIR) 10 mg tablet Take 10 mg by mouth once daily.    progesterone (PROMETRIUM) 100 MG capsule TK ONE C PO ONCE A DAY HS    selenium 200 mcg Cap Take by mouth once daily.    topiramate (TOPAMAX) 50 MG tablet Take 75 mg by mouth 2 (two) times daily.     No current facility-administered medications on file prior to visit.        Objective:      Vitals:    12/05/19 0915   BP: 130/80   Pulse: 91     Physical Exam   Constitutional: She is oriented to person, place, and time. She appears well-developed and well-nourished.   HENT:   Nose: No mucosal edema.   Mouth/Throat: Oropharynx is clear and moist. Mallampati Score: I.   Neck: No JVD present. No tracheal deviation present.   Cardiovascular: Normal rate, regular rhythm and intact distal pulses.   Pulmonary/Chest: Normal expansion and hyperinflation. No respiratory distress. She has no wheezes. She has no rhonchi. She has no rales.   Abdominal: Soft. Bowel sounds are normal.   Musculoskeletal: Normal range of motion. She exhibits no edema.   Lymphadenopathy: No supraclavicular adenopathy is present.     She has no cervical adenopathy.   Neurological: She is alert and oriented to person, place, and time. Gait normal.   Skin: Skin is warm and dry. No rash noted. No cyanosis or erythema. Nails show no clubbing.   Psychiatric: She has a normal mood and affect.   Nursing note and vitals reviewed.      Personal Diagnostic Review        CXR 12/5/19- Images personally reviewed and compared to previous in 2012. I agree w/ the radiologist who notes;  No acute process    PFTs today- PFTs are normal without significant improvement in spirometry post BD Rx.     Care Everywhere  CXR 6/2019  Radiology read only-  Perihilar bronchiectasis, left greater than right, and worsening mucoid impaction in the lingular segment of the left upper lobe      No flowsheet data found.        Assessment:     Problem List Items Addressed This Visit        Pulmonary    Moderate persistent asthma without complication - Primary    Current Assessment & Plan     Recurrent episodes of pneumonia in the last year with increased cough and inhaler use requirements. Symptoms similar to when she was dx with MAC. OSH CXR report with possible mucoid impaction within bronchiectasis making ABPA a consideration, although no significant abnormalities were noted on CXR here.     - Follow up CT results from next week  - cont ICS/LABA, LKA, RAYSHAWN  - Encouraged regular, progressive exercise  - UTD on vaccines            ID    Nontuberculous atypical mycobacterial disease    Current Assessment & Plan     Dx in 2009 via bronchoscopy. MAC. Underwent 3 years of abx            Other    Allergic rhinitis    Current Assessment & Plan     INS and INMA

## 2019-12-06 LAB
DLCO ADJ PRE: 21.13 ML/(MIN*MMHG) (ref 18.29–29.76)
DLCO SINGLE BREATH LLN: 18.29
DLCO SINGLE BREATH PRE REF: 87.9 %
DLCO SINGLE BREATH REF: 24.02
DLCOC SBVA LLN: 3.33
DLCOC SBVA PRE REF: 84.7 %
DLCOC SBVA REF: 4.84
DLCOC SINGLE BREATH LLN: 18.29
DLCOC SINGLE BREATH PRE REF: 87.9 %
DLCOC SINGLE BREATH REF: 24.02
DLCOCSBVAULN: 6.34
DLCOCSINGLEBREATHULN: 29.76
DLCOSINGLEBREATHULN: 29.76
DLCOVA LLN: 3.33
DLCOVA PRE REF: 84.7 %
DLCOVA PRE: 4.09 ML/(MIN*MMHG*L) (ref 3.33–6.34)
DLCOVA REF: 4.84
DLCOVAULN: 6.34
DLVAADJ PRE: 4.09 ML/(MIN*MMHG*L) (ref 3.33–6.34)
ERV LLN: 0.92
ERV REF: 0.92
ERVN2 LLN: 0.92
ERVN2 PRE REF: 71.7 %
ERVN2 PRE: 0.66 L (ref 0.92–0.92)
ERVN2 REF: 0.92
ERVN2ULN: 0.92
ERVULN: 0.92
FEF 25 75 LLN: 1.44
FEF 25 75 PRE REF: 56.3 %
FEF 25 75 REF: 2.62
FET100 CHG: -3.8 %
FEV05 LLN: 1.11
FEV05 REF: 1.96
FEV1 CHG: 2.5 %
FEV1 FVC LLN: 69
FEV1 FVC PRE REF: 85.6 %
FEV1 FVC REF: 80
FEV1 LLN: 2.11
FEV1 PRE REF: 85.3 %
FEV1 REF: 2.72
FRCN2 LLN: 1.88
FRCN2 PRE REF: 107.7 %
FRCN2 REF: 2.7
FRCN2ULN: 3.53
FRCPLETH LLN: 1.88
FRCPLETH REF: 2.7
FRCPLETHULN: 3.53
FVC CHG: 1.2 %
FVC LLN: 2.65
FVC PRE REF: 99 %
FVC REF: 3.41
IVC PRE: 3.11 L (ref 2.65–4.17)
IVC SINGLE BREATH LLN: 2.65
IVC SINGLE BREATH PRE REF: 91.3 %
IVC SINGLE BREATH REF: 3.41
IVCSINGLEBREATHULN: 4.17
MVV LLN: 86
MVV REF: 102
PEF LLN: 4.97
PEF PRE REF: 77.8 %
PEF REF: 6.69
PHYSICIAN COMMENT: ABNORMAL
POST FEF 25 75: 1.52 L/S (ref 1.44–3.81)
POST FET 100: 5.62 SEC
POST FEV1 FVC: 69.56 % (ref 68.9–91.51)
POST FEV1: 2.38 L (ref 2.11–3.33)
POST FEV5: 1.78 L (ref 1.11–2.82)
POST FVC: 3.42 L (ref 2.65–4.17)
POST PEF: 5.85 L/S (ref 4.97–8.41)
PRE DLCO: 21.13 ML/(MIN*MMHG) (ref 18.29–29.76)
PRE FEF 25 75: 1.48 L/S (ref 1.44–3.81)
PRE FET 100: 5.85 SEC
PRE FEV05 REF: 89.9 %
PRE FEV1 FVC: 68.68 % (ref 68.9–91.51)
PRE FEV1: 2.32 L (ref 2.11–3.33)
PRE FEV5: 1.76 L (ref 1.11–2.82)
PRE FRC N2: 2.91 L
PRE FVC: 3.38 L (ref 2.65–4.17)
PRE PEF: 5.21 L/S (ref 4.97–8.41)
RV LLN: 1.21
RV REF: 1.78
RVN2 LLN: 1.21
RVN2 PRE REF: 126.2 %
RVN2 PRE: 2.25 L (ref 1.21–2.36)
RVN2 REF: 1.78
RVN2TLCN2 LLN: 27.05
RVN2TLCN2 PRE REF: 150.5 %
RVN2TLCN2 PRE: 55.15 % (ref 27.05–46.23)
RVN2TLCN2 REF: 36.64
RVN2TLCN2ULN: 46.23
RVN2ULN: 2.36
RVTLC LLN: 27
RVTLC REF: 37
RVTLCULN: 46
RVULN: 2.36
TLC LLN: 3.98
TLC REF: 4.97
TLC ULN: 5.96
TLCN2 LLN: 3.98
TLCN2 PRE REF: 82.1 %
TLCN2 PRE: 4.08 L (ref 3.98–5.96)
TLCN2 REF: 4.97
TLCN2ULN: 5.96
VA PRE: 5.16 L (ref 4.82–4.82)
VA SINGLE BREATH LLN: 4.82
VA SINGLE BREATH PRE REF: 107.1 %
VA SINGLE BREATH REF: 4.82
VASINGLEBREATHULN: 4.82
VC LLN: 2.65
VC REF: 3.41
VC ULN: 4.17
VCMAXN2 LLN: 2.65
VCMAXN2 PRE REF: 53.7 %
VCMAXN2 PRE: 1.83 L (ref 2.65–4.17)
VCMAXN2 REF: 3.41
VCMAXN2ULN: 4.17

## 2019-12-09 NOTE — ASSESSMENT & PLAN NOTE
Recurrent episodes of pneumonia in the last year with increased cough and inhaler use requirements. Symptoms similar to when she was dx with MAC. OSH CXR report with possible mucoid impaction within bronchiectasis making ABPA a consideration, although no significant abnormalities were noted on CXR here.     - Follow up CT results from next week  - cont ICS/LABA, LKA, RAYSHAWN  - Encouraged regular, progressive exercise  - UTD on vaccines

## 2021-02-26 ENCOUNTER — IMMUNIZATION (OUTPATIENT)
Dept: PHARMACY | Facility: CLINIC | Age: 56
End: 2021-02-26
Payer: COMMERCIAL

## 2021-02-26 DIAGNOSIS — Z23 NEED FOR VACCINATION: Primary | ICD-10-CM

## 2021-03-26 ENCOUNTER — IMMUNIZATION (OUTPATIENT)
Dept: PHARMACY | Facility: CLINIC | Age: 56
End: 2021-03-26
Payer: COMMERCIAL

## 2021-03-26 DIAGNOSIS — Z23 NEED FOR VACCINATION: Primary | ICD-10-CM

## 2023-07-12 PROBLEM — K64.4 ANAL SKIN TAG: Status: ACTIVE | Noted: 2023-07-12

## (undated) DEVICE — APPLICATOR CHLORAPREP ORN 26ML

## (undated) DEVICE — SUT MONOCRYL 3-0 PS-2 UND

## (undated) DEVICE — DRESSING LEUKOPLAST FLEX 1X3IN

## (undated) DEVICE — UNDERGLOVE BIOGEL PI SZ 6.5 LF

## (undated) DEVICE — SOL IRR NACL .9% 3000ML

## (undated) DEVICE — ELECTRODE REM PLYHSV RETURN 9

## (undated) DEVICE — ELECTRODE COOLPULSE 90 W/HAND

## (undated) DEVICE — SEE L#120831

## (undated) DEVICE — SCALPEL #15 BLADE STRL DISP.

## (undated) DEVICE — KIT ASSISTARM SH KN STRL

## (undated) DEVICE — STRIP STERI REIN CLSR 1/2X2IN

## (undated) DEVICE — CLOSURE SKIN STERI STRIP 1/2X4

## (undated) DEVICE — PAD ABD 8X10 STERILE

## (undated) DEVICE — TAPE MEDIPORE 3 X 10YD

## (undated) DEVICE — KIT TRIMANO

## (undated) DEVICE — GAUZE SPONGE 4'X4 12 PLY

## (undated) DEVICE — BLADE GATOR 4.2

## (undated) DEVICE — Device

## (undated) DEVICE — CANNULA SHOULDER 10/BOX

## (undated) DEVICE — SYR 10CC LUER LOCK

## (undated) DEVICE — TAPE SURG MEDIPORE 6X72IN

## (undated) DEVICE — SET IRR URLGY 2LINE UNIV SPIKE

## (undated) DEVICE — PAD ABDOMINAL 5X9 STERILE

## (undated) DEVICE — SEE MEDLINE ITEM 157160

## (undated) DEVICE — GAUZE SPONGE 4X4 12PLY

## (undated) DEVICE — MAT SUCTION PUDDLEVAC ORANGE

## (undated) DEVICE — GLOVE BIOGEL SKINSENSE PI 8.5

## (undated) DEVICE — DRAPE STERI U-SHAPED 47X51IN

## (undated) DEVICE — GLOVE BIOGEL SKINSENSE PI 6.5